# Patient Record
Sex: MALE | Race: BLACK OR AFRICAN AMERICAN | NOT HISPANIC OR LATINO
[De-identification: names, ages, dates, MRNs, and addresses within clinical notes are randomized per-mention and may not be internally consistent; named-entity substitution may affect disease eponyms.]

---

## 2021-06-27 ENCOUNTER — RX RENEWAL (OUTPATIENT)
Age: 61
End: 2021-06-27

## 2021-07-24 ENCOUNTER — RX RENEWAL (OUTPATIENT)
Age: 61
End: 2021-07-24

## 2021-08-17 ENCOUNTER — TRANSCRIPTION ENCOUNTER (OUTPATIENT)
Age: 61
End: 2021-08-17

## 2021-08-29 ENCOUNTER — RX RENEWAL (OUTPATIENT)
Age: 61
End: 2021-08-29

## 2021-09-07 ENCOUNTER — RX RENEWAL (OUTPATIENT)
Age: 61
End: 2021-09-07

## 2021-09-21 ENCOUNTER — APPOINTMENT (OUTPATIENT)
Dept: FAMILY MEDICINE | Facility: CLINIC | Age: 61
End: 2021-09-21
Payer: COMMERCIAL

## 2021-09-21 VITALS
OXYGEN SATURATION: 98 % | TEMPERATURE: 97 F | DIASTOLIC BLOOD PRESSURE: 90 MMHG | SYSTOLIC BLOOD PRESSURE: 130 MMHG | HEART RATE: 64 BPM | BODY MASS INDEX: 31.39 KG/M2 | WEIGHT: 200 LBS | HEIGHT: 67 IN

## 2021-09-21 PROCEDURE — 99213 OFFICE O/P EST LOW 20 MIN: CPT

## 2021-09-21 NOTE — HISTORY OF PRESENT ILLNESS
[de-identified] : Patient is here for BP check . Patient has been compliant with exercise , low sodium diet , decreased caffeine intake and compliance with medications. He has been checking BPs sporadically and is her for BP review . BP readings average 140/90\par Patient recently had fasting labs done with employer for wellness check . He admits his Cholesterol was still elevated, he still declines Statin medication . He will forward copies of results \par

## 2021-09-21 NOTE — PLAN
[FreeTextEntry1] : BPs reviewed today and borderline elevated . Advised to continue medication and HCTZ rx provided as well , diet , exercise and BP monitoring and followups.\par I have advised CV evaluation due to HTN and Hyperlipidemia . Pt provided with referral list \par He will inform me re BP readings with Hctz addition, if improved , refill of Losartan /Hctz will be refilled

## 2021-10-01 ENCOUNTER — RX RENEWAL (OUTPATIENT)
Age: 61
End: 2021-10-01

## 2022-06-01 ENCOUNTER — RESULT CHARGE (OUTPATIENT)
Age: 62
End: 2022-06-01

## 2022-06-02 ENCOUNTER — NON-APPOINTMENT (OUTPATIENT)
Age: 62
End: 2022-06-02

## 2022-06-02 ENCOUNTER — APPOINTMENT (OUTPATIENT)
Dept: FAMILY MEDICINE | Facility: CLINIC | Age: 62
End: 2022-06-02
Payer: COMMERCIAL

## 2022-06-02 ENCOUNTER — LABORATORY RESULT (OUTPATIENT)
Age: 62
End: 2022-06-02

## 2022-06-02 ENCOUNTER — APPOINTMENT (OUTPATIENT)
Dept: FAMILY MEDICINE | Facility: CLINIC | Age: 62
End: 2022-06-02

## 2022-06-02 VITALS
WEIGHT: 198 LBS | TEMPERATURE: 97.2 F | SYSTOLIC BLOOD PRESSURE: 128 MMHG | BODY MASS INDEX: 31.08 KG/M2 | HEIGHT: 67 IN | HEART RATE: 63 BPM | OXYGEN SATURATION: 98 % | DIASTOLIC BLOOD PRESSURE: 70 MMHG

## 2022-06-02 VITALS — DIASTOLIC BLOOD PRESSURE: 92 MMHG | SYSTOLIC BLOOD PRESSURE: 140 MMHG

## 2022-06-02 DIAGNOSIS — R94.31 ABNORMAL ELECTROCARDIOGRAM [ECG] [EKG]: ICD-10-CM

## 2022-06-02 DIAGNOSIS — Z00.00 ENCOUNTER FOR GENERAL ADULT MEDICAL EXAMINATION W/OUT ABNORMAL FINDINGS: ICD-10-CM

## 2022-06-02 DIAGNOSIS — E78.5 HYPERLIPIDEMIA, UNSPECIFIED: ICD-10-CM

## 2022-06-02 DIAGNOSIS — I10 ESSENTIAL (PRIMARY) HYPERTENSION: ICD-10-CM

## 2022-06-02 DIAGNOSIS — Z01.84 ENCOUNTER FOR ANTIBODY RESPONSE EXAMINATION: ICD-10-CM

## 2022-06-02 PROCEDURE — 93000 ELECTROCARDIOGRAM COMPLETE: CPT

## 2022-06-02 PROCEDURE — 99396 PREV VISIT EST AGE 40-64: CPT | Mod: 25

## 2022-06-02 PROCEDURE — 36415 COLL VENOUS BLD VENIPUNCTURE: CPT

## 2022-06-02 NOTE — HEALTH RISK ASSESSMENT
[Good] : ~his/her~  mood as  good [Former] : Former [Yes] : Yes [2 - 4 times a month (2 pts)] : 2-4 times a month (2 points) [1 or 2 (0 pts)] : 1 or 2 (0 points) [Never (0 pts)] : Never (0 points) [No] : In the past 12 months have you used drugs other than those required for medical reasons? No [No falls in past year] : Patient reported no falls in the past year [0] : 2) Feeling down, depressed, or hopeless: Not at all (0) [PHQ-2 Negative - No further assessment needed] : PHQ-2 Negative - No further assessment needed [No Retinopathy] : No retinopathy [Patient reported colonoscopy was normal] : Patient reported colonoscopy was normal [HIV test declined] : HIV test declined [None] : None [With Significant Other] : lives with significant other [Retired] : retired [College] : College [] :  [# Of Children ___] : has [unfilled] children [Sexually Active] : sexually active [Feels Safe at Home] : Feels safe at home [Fully functional (bathing, dressing, toileting, transferring, walking, feeding)] : Fully functional (bathing, dressing, toileting, transferring, walking, feeding) [Fully functional (using the telephone, shopping, preparing meals, housekeeping, doing laundry, using] : Fully functional and needs no help or supervision to perform IADLs (using the telephone, shopping, preparing meals, housekeeping, doing laundry, using transportation, managing medications and managing finances) [Smoke Detector] : smoke detector [Carbon Monoxide Detector] : carbon monoxide detector [Safety elements used in home] : safety elements used in home [Seat Belt] :  uses seat belt [Sunscreen] : uses sunscreen [With Patient/Caregiver] : , with patient/caregiver [Reviewed no changes] : Reviewed, no changes [I will adhere to the patient's wishes.] : I will adhere to the patient's wishes. [YearQuit] : quit at age 30 [Audit-CScore] : 2 [de-identified] : active, walks daily [de-identified] : balanced, room for improvement, eats a lot of sweets, vegetables, avoids fried foods [HVM6Phspv] : 0 [EyeExamDate] : 05/2022 [Change in mental status noted] : No change in mental status noted [Language] : denies difficulty with language [High Risk Behavior] : no high risk behavior [Reports changes in hearing] : Reports no changes in hearing [Reports changes in vision] : Reports no changes in vision [Reports changes in dental health] : Reports no changes in dental health [Guns at Home] : no guns at home [Travel to Developing Areas] : does not  travel to developing areas [ColonoscopyDate] : 10/2018 [ColonoscopyComments] : repeat 7 years [FreeTextEntry2] : Was

## 2022-06-02 NOTE — PLAN
[FreeTextEntry1] : See assessment.\par RTO 1 year for CPE.\par RTO 3 months BP check.\par Follow up with Cardiology.

## 2022-06-02 NOTE — ASSESSMENT
[FreeTextEntry1] : Patient is a 61yo male with PMH HTN, HLD who presents to the office for CPE.\par \par Healthcare Maintenance\par - Due for dermatology, pt encouraged to follow up once settled in [place].\par - Reviewed dental exams should be every 6 months.\par - UTD with colonoscopy, Tdap.\par - Unknown prior varicella infection, unsure of desire for Shingrix, will check Varicella titers.\par - Fasting labs drawn in office.\par \par HTN\par - BP borderline elevated in office today, 140/92.\par - Has been around 140's/90's at home when checking intermittently.\par - Discussed increasing BP medication dose, pt prefers to work on diet/exercise to see if he can bring BP down.\par - DASH diet.\par - Try and incorporate 30 minutes of aerobic exercise to your daily routine.\par \par HLD\par - Fasting labs drawn in office.\par - Further recommendations pending blood work results.\par - Limit/avoid greasy foods, fried foods, fatty foods, and saturated fat in your diet and try and eat more lean proteins.\par \par Abnormal EKG\par - EKG in office NSR with anterolateral TWI, no change from priors in 2019 (scanned into chart).\par - Pt with chronic TWI on EKGs dating back to as far as 2012 in [facility] charts.\par - Pt without cardiac complaints at this time.\par - Had full cardiac work up 10 years ago which was normal.\par - Pt encouraged to follow up with Cardiology for repeat cardiac testing.\par - Offered pt referral, pt declines, states he will do his own research and follow up with a Cardiologist in the near future.\par - Pt understands he should go to the ED immediately if he develops any cardiac complaints including lightheadedness, dizziness, LOC, CP, SOB, palpitations, LE edema or any other concerning symptoms.\par \par Call the office or go to the ED immediately if you develop new, worsening or concerning symptoms including high fever, severe headache/worst headache of your life, confusion, dizziness/lightheadedness, loss of consciousness, severe chest pain, difficulty breathing, shortness of breath, severe abdominal pain, excessive vomiting/diarrhea, inability to feel/move the extremities, or any other concerning symptoms.\par

## 2022-06-02 NOTE — HISTORY OF PRESENT ILLNESS
[FreeTextEntry1] : CPE. [de-identified] : Patient is a 62yo male with PMH HTN, HLD who presents to the office for CPE. \par  \par Last CPE:  07/13/2020.\par Colonoscopy:  [facility] 10/26/2018, repeat in 7 years (2025). \par Ophthalmology:  follows regularly.\par Dermatology:  does not follow.\par Dentist:  approximately 1x/year.\par PSA:  1.3 on 07/13/2020.\par Shingles:  does not recall having chicken pox.  Will check titers today.\par Flu:  declines.\par Tdap:  04/03/2019. \par COVID:  received Moderna x2.\par \par In regards to HTN, patient has been taking Losartan 50mg daily and HCTZ 12.5mg daily and is tolerating these medications well.  Patient does check BP at home intermittently, states has been consistent 140's/90's.  Patient denies headache, dizziness, vision changes, chest pain, or lower extremity edema. \par \[facility] Pt is not currently on medication for HLD.  Last cholesterol check at our office was 07/13/2020.  Total cholesterol 261, . \par \par Does not follow up with Cardiology.  Has seen in the past approximately 10 years ago after abnormal EKG in our office.  Work up negative.\par \par Pt is currently in the process of moving to [place].  Recently retired.

## 2022-06-03 ENCOUNTER — NON-APPOINTMENT (OUTPATIENT)
Age: 62
End: 2022-06-03

## 2022-06-03 DIAGNOSIS — D72.819 DECREASED WHITE BLOOD CELL COUNT, UNSPECIFIED: ICD-10-CM

## 2022-06-03 DIAGNOSIS — E55.9 VITAMIN D DEFICIENCY, UNSPECIFIED: ICD-10-CM

## 2022-06-03 DIAGNOSIS — R73.03 PREDIABETES.: ICD-10-CM

## 2022-06-03 LAB
25(OH)D3 SERPL-MCNC: 19.3 NG/ML
ALBUMIN SERPL ELPH-MCNC: 4.6 G/DL
ALP BLD-CCNC: 89 U/L
ALT SERPL-CCNC: 25 U/L
ANION GAP SERPL CALC-SCNC: 15 MMOL/L
AST SERPL-CCNC: 28 U/L
BASOPHILS # BLD AUTO: 0 K/UL
BASOPHILS NFR BLD AUTO: 0 %
BILIRUB SERPL-MCNC: 0.3 MG/DL
BUN SERPL-MCNC: 14 MG/DL
CALCIUM SERPL-MCNC: 9.2 MG/DL
CHLORIDE SERPL-SCNC: 104 MMOL/L
CHOLEST SERPL-MCNC: 271 MG/DL
CO2 SERPL-SCNC: 24 MMOL/L
CREAT SERPL-MCNC: 1.11 MG/DL
EGFR: 76 ML/MIN/1.73M2
EOSINOPHIL # BLD AUTO: 0.23 K/UL
EOSINOPHIL NFR BLD AUTO: 6.6 %
ESTIMATED AVERAGE GLUCOSE: 126 MG/DL
GLUCOSE SERPL-MCNC: 91 MG/DL
HBA1C MFR BLD HPLC: 6 %
HCT VFR BLD CALC: 46.9 %
HDLC SERPL-MCNC: 62 MG/DL
HGB BLD-MCNC: 14.2 G/DL
LDLC SERPL CALC-MCNC: 194 MG/DL
LYMPHOCYTES # BLD AUTO: 1.68 K/UL
LYMPHOCYTES NFR BLD AUTO: 47.2 %
MAN DIFF?: NORMAL
MCHC RBC-ENTMCNC: 28.1 PG
MCHC RBC-ENTMCNC: 30.3 GM/DL
MCV RBC AUTO: 92.9 FL
MONOCYTES # BLD AUTO: 0.37 K/UL
MONOCYTES NFR BLD AUTO: 10.4 %
NEUTROPHILS # BLD AUTO: 1.17 K/UL
NEUTROPHILS NFR BLD AUTO: 33 %
NONHDLC SERPL-MCNC: 210 MG/DL
PLATELET # BLD AUTO: 218 K/UL
POTASSIUM SERPL-SCNC: 3.9 MMOL/L
PROT SERPL-MCNC: 7.6 G/DL
PSA SERPL-MCNC: 1.18 NG/ML
RBC # BLD: 5.05 M/UL
RBC # FLD: 13.8 %
SODIUM SERPL-SCNC: 143 MMOL/L
TRIGL SERPL-MCNC: 79 MG/DL
TSH SERPL-ACNC: 0.83 UIU/ML
VZV AB TITR SER: POSITIVE
VZV IGG SER IF-ACNC: 1053 INDEX
WBC # FLD AUTO: 3.56 K/UL

## 2022-06-03 RX ORDER — ERGOCALCIFEROL 1.25 MG/1
1.25 MG CAPSULE, LIQUID FILLED ORAL
Qty: 8 | Refills: 0 | Status: ACTIVE | COMMUNITY
Start: 2022-06-03 | End: 1900-01-01

## 2022-06-08 ENCOUNTER — APPOINTMENT (OUTPATIENT)
Dept: FAMILY MEDICINE | Facility: CLINIC | Age: 62
End: 2022-06-08

## 2022-10-07 ENCOUNTER — NON-APPOINTMENT (OUTPATIENT)
Age: 62
End: 2022-10-07

## 2023-02-24 ENCOUNTER — RX RENEWAL (OUTPATIENT)
Age: 63
End: 2023-02-24

## 2023-02-27 RX ORDER — LOSARTAN POTASSIUM 50 MG/1
50 TABLET, FILM COATED ORAL DAILY
Qty: 90 | Refills: 0 | Status: ACTIVE | COMMUNITY
Start: 2021-06-27 | End: 1900-01-01

## 2023-06-01 ENCOUNTER — RX RENEWAL (OUTPATIENT)
Age: 63
End: 2023-06-01